# Patient Record
Sex: FEMALE | URBAN - NONMETROPOLITAN AREA
[De-identification: names, ages, dates, MRNs, and addresses within clinical notes are randomized per-mention and may not be internally consistent; named-entity substitution may affect disease eponyms.]

---

## 2017-01-01 ENCOUNTER — TELEPHONE (OUTPATIENT)
Dept: FAMILY MEDICINE CLINIC | Facility: CLINIC | Age: 77
End: 2017-01-01

## 2017-01-01 DIAGNOSIS — Z51.5 HOSPICE CARE PATIENT: Primary | ICD-10-CM

## 2017-01-01 DIAGNOSIS — J44.9 CHRONIC OBSTRUCTIVE PULMONARY DISEASE, UNSPECIFIED COPD TYPE (HCC): Primary | ICD-10-CM

## 2017-01-01 DIAGNOSIS — Z51.5 HOSPICE CARE PATIENT: ICD-10-CM

## 2017-01-01 DIAGNOSIS — N18.6 ESRF (END STAGE RENAL FAILURE) (HCC): ICD-10-CM

## 2017-01-01 RX ORDER — TRAMADOL HYDROCHLORIDE 50 MG/1
50 TABLET ORAL EVERY 6 HOURS PRN
Qty: 60 TABLET | Refills: 0 | COMMUNITY
Start: 2017-01-01

## 2017-01-01 RX ORDER — TRAMADOL HYDROCHLORIDE 50 MG/1
50 TABLET ORAL EVERY 6 HOURS PRN
Qty: 120 TABLET | Refills: 0 | Status: SHIPPED | OUTPATIENT
Start: 2017-01-01 | End: 2017-01-01

## 2017-01-01 RX ORDER — LORAZEPAM 2 MG/ML
0.5 CONCENTRATE ORAL EVERY 2 HOUR PRN
Qty: 30 ML | Refills: 1 | Status: SHIPPED | OUTPATIENT
Start: 2017-01-01

## 2017-01-01 RX ORDER — ATROPINE SULFATE 10 MG/ML
SOLUTION/ DROPS OPHTHALMIC
Qty: 5 ML | Refills: 2 | COMMUNITY
Start: 2017-01-01 | End: 2017-01-01

## 2017-01-01 RX ORDER — ATROPINE SULFATE 10 MG/ML
SOLUTION/ DROPS OPHTHALMIC
Qty: 5 ML | Refills: 2 | Status: SHIPPED | OUTPATIENT
Start: 2017-01-01

## 2017-01-01 RX ORDER — LORAZEPAM 2 MG/ML
0.5 CONCENTRATE ORAL EVERY 2 HOUR PRN
Qty: 30 ML | Refills: 1 | COMMUNITY
Start: 2017-01-01 | End: 2017-01-01

## 2017-01-01 RX ORDER — MORPHINE SULFATE 100 MG/5ML
SOLUTION ORAL
Qty: 30 ML | Refills: 0 | Status: SHIPPED | OUTPATIENT
Start: 2017-01-01

## 2017-01-01 RX ORDER — MORPHINE SULFATE 100 MG/5ML
SOLUTION ORAL
Qty: 30 ML | Refills: 0 | COMMUNITY
Start: 2017-01-01 | End: 2017-01-01

## 2017-01-01 RX ORDER — IPRATROPIUM BROMIDE AND ALBUTEROL SULFATE 2.5; .5 MG/3ML; MG/3ML
3 SOLUTION RESPIRATORY (INHALATION) EVERY 6 HOURS PRN
Qty: 360 ML | Refills: 0 | Status: SHIPPED
Start: 2017-01-01 | End: 2017-01-01

## 2017-01-03 ENCOUNTER — LAB SERVICES (OUTPATIENT)
Dept: OTHER | Age: 77
End: 2017-01-03

## 2017-01-03 LAB
BASOPHIL AUTOMATED: 0.6 %
BASOPHILS: 0.1 (ref 0–0.2)
BEDSIDE GLUCOSE: 86 MG/DL (ref 70–110)
EOSINOPHIL AUTOMATED: 3.6 %
EOSINOPHILS: 0.3 (ref 0–0.5)
HEMATOCRIT: 41.3 % (ref 34.7–45.1)
HEMOGLOBIN: 13 GM/DL (ref 12–15.3)
LYMPHOCYTE AUTOMATED: 15.5 %
LYMPHOCYTES: 1.5 (ref 0.6–3.4)
MEAN CORPUSCULAR HGB: 26.1 PG (ref 26–34)
MEAN CORPUSCULAR HGB: 31.4 G/DL (ref 32.5–35.8)
MEAN CORPUSCULAR VOL: 83.1 FL (ref 80–100)
MEAN PLATELET VOLUME: 9.6 FL (ref 6.8–10.2)
MONOCYTE AUTOMATED: 7.2 %
MONOCYTES: 0.7 (ref 0.3–1)
NEUTROPHIL ABSOLUTE: 6.9 (ref 1.7–7.7)
NEUTROPHIL AUTOMATED: 73.1 %
PLATELET COUNT: 213 K/MM3 (ref 150–450)
RED BLOOD CELL COUNT: 4.97 M/MM3 (ref 3.63–5.04)
RED CELL DISTRIBUTIO: 16.3 % (ref 11.9–15.9)
WHITE BLOOD CELL COU: 9.5 K/MM3 (ref 4–11)

## 2017-01-04 ENCOUNTER — CHARTING TRANS (OUTPATIENT)
Dept: OTHER | Age: 77
End: 2017-01-04

## 2017-01-09 ENCOUNTER — CHARTING TRANS (OUTPATIENT)
Dept: OTHER | Age: 77
End: 2017-01-09

## 2017-01-09 ENCOUNTER — LAB SERVICES (OUTPATIENT)
Dept: OTHER | Age: 77
End: 2017-01-09

## 2017-01-09 LAB
ANION GAP: 3 MEQ/L (ref 8–16)
BASOPHIL AUTOMATED: 0.6 %
BASOPHILS: 0 (ref 0–0.2)
BLOOD UREA NITROGEN (BUN): 26 MG/DL (ref 7–25)
BUN/CREATININE RATIO: 32.1 (ref 7.4–23)
C-REACTIVE PROTEIN, TITER: 2 MG/DL (ref 0–0.9)
CALCIUM, SERUM: 9.3 MG/DL (ref 8.6–10.3)
CARBON DIOXIDE: 29 MMOL/L (ref 21–31)
CHLORIDE, SERUM: 99 MMOL/L (ref 98–107)
CREATININE: 0.81 MG/DL (ref 0.6–1.2)
EOSINOPHIL AUTOMATED: 4 %
EOSINOPHILS: 0.3 (ref 0–0.5)
EST GLOMERULAR FILTRATION RATE: > 60 1.73M SQ
GLUCOSE P FAST SERPL-MCNC: 294 MG/DL (ref 70–99)
HEMATOCRIT: 39 % (ref 34.7–45.1)
HEMOGLOBIN: 12.3 GM/DL (ref 12–15.3)
K (POTASSIUM, SERUM): 4.3 MMOL/L (ref 3.5–5.1)
LYMPHOCYTE AUTOMATED: 11.8 %
LYMPHOCYTES: 0.9 (ref 0.6–3.4)
MEAN CORPUSCULAR HGB: 25.6 PG (ref 26–34)
MEAN CORPUSCULAR HGB: 31.4 G/DL (ref 32.5–35.8)
MEAN CORPUSCULAR VOL: 81.7 FL (ref 80–100)
MEAN PLATELET VOLUME: 9.4 FL (ref 6.8–10.2)
MONOCYTE AUTOMATED: 5.5 %
MONOCYTES: 0.4 (ref 0.3–1)
NA (SODIUM, SERUM): 131 MMOL/L (ref 133–144)
NEUTROPHIL ABSOLUTE: 6.3 (ref 1.7–7.7)
NEUTROPHIL AUTOMATED: 78.1 %
PLATELET COUNT: 228 K/MM3 (ref 150–450)
RED BLOOD CELL COUNT: 4.78 M/MM3 (ref 3.63–5.04)
RED CELL DISTRIBUTIO: 16.2 % (ref 11.9–15.9)
SEDIMENTATION RATE: 58 MM/HR (ref 0–30)
WHITE BLOOD CELL COU: 8 K/MM3 (ref 4–11)

## 2017-01-12 ENCOUNTER — CHARTING TRANS (OUTPATIENT)
Dept: OTHER | Age: 77
End: 2017-01-12

## 2017-01-16 ENCOUNTER — LAB SERVICES (OUTPATIENT)
Dept: OTHER | Age: 77
End: 2017-01-16

## 2017-01-16 LAB
ANION GAP: 6 MEQ/L (ref 8–16)
BASOPHIL AUTOMATED: 1 %
BASOPHILS: 0.1 (ref 0–0.2)
BLOOD UREA NITROGEN (BUN): 23 MG/DL (ref 7–25)
BUN/CREATININE RATIO: 26.7 (ref 7.4–23)
C-REACTIVE PROTEIN, TITER: 2.3 MG/DL (ref 0–0.9)
CALCIUM, SERUM: 9.1 MG/DL (ref 8.6–10.3)
CARBON DIOXIDE: 30 MMOL/L (ref 21–31)
CHLORIDE, SERUM: 100 MMOL/L (ref 98–107)
CREATININE: 0.86 MG/DL (ref 0.6–1.2)
EOSINOPHIL AUTOMATED: 3.5 %
EOSINOPHILS: 0.3 (ref 0–0.5)
EST GLOMERULAR FILTRATION RATE: > 60 1.73M SQ
GLUCOSE P FAST SERPL-MCNC: 344 MG/DL (ref 70–99)
HEMATOCRIT: 38.2 % (ref 34.7–45.1)
HEMOGLOBIN: 12.1 GM/DL (ref 12–15.3)
K (POTASSIUM, SERUM): 4.2 MMOL/L (ref 3.5–5.1)
LYMPHOCYTE AUTOMATED: 10.7 %
LYMPHOCYTES: 0.9 (ref 0.6–3.4)
MEAN CORPUSCULAR HGB: 25.8 PG (ref 26–34)
MEAN CORPUSCULAR HGB: 31.7 G/DL (ref 32.5–35.8)
MEAN CORPUSCULAR VOL: 81.4 FL (ref 80–100)
MEAN PLATELET VOLUME: 9.4 FL (ref 6.8–10.2)
MONOCYTE AUTOMATED: 5.1 %
MONOCYTES: 0.5 (ref 0.3–1)
NA (SODIUM, SERUM): 136 MMOL/L (ref 133–144)
NEUTROPHIL ABSOLUTE: 7 (ref 1.7–7.7)
NEUTROPHIL AUTOMATED: 79.7 %
PLATELET COUNT: 206 K/MM3 (ref 150–450)
RED BLOOD CELL COUNT: 4.69 M/MM3 (ref 3.63–5.04)
RED CELL DISTRIBUTIO: 16.2 % (ref 11.9–15.9)
SEDIMENTATION RATE: 53 MM/HR (ref 0–30)
WHITE BLOOD CELL COU: 8.8 K/MM3 (ref 4–11)

## 2017-01-17 ENCOUNTER — CHARTING TRANS (OUTPATIENT)
Dept: OTHER | Age: 77
End: 2017-01-17

## 2017-01-18 ENCOUNTER — CHARTING TRANS (OUTPATIENT)
Dept: NEPHROLOGY | Age: 77
End: 2017-01-18

## 2017-01-20 ENCOUNTER — LAB SERVICES (OUTPATIENT)
Dept: OTHER | Age: 77
End: 2017-01-20

## 2017-01-20 ENCOUNTER — CHARTING TRANS (OUTPATIENT)
Dept: OTHER | Age: 77
End: 2017-01-20

## 2017-01-20 LAB
BILIRUBIN URINE: NEGATIVE
BLOOD URINE: ABNORMAL
CLARITY: CLEAR
COLOR: YELLOW
GLUCOSE QUALITATIVE U: NEGATIVE
KETONES, URINE: NEGATIVE
LEUKOCYTE ESTERASE URINE: NEGATIVE
NITRITE URINE: NEGATIVE
PH URINE: 5 (ref 5–7)
SPECIFIC GRAVITY URINE: 1.01 (ref 1–1.03)
URINE PROTEIN, QUAL (DIPSTICK): NEGATIVE
UROBILINOGEN URINE: <2

## 2017-01-24 ENCOUNTER — CHARTING TRANS (OUTPATIENT)
Dept: OTHER | Age: 77
End: 2017-01-24

## 2017-01-27 ENCOUNTER — IMAGING SERVICES (OUTPATIENT)
Dept: OTHER | Age: 77
End: 2017-01-27

## 2017-01-27 ENCOUNTER — CHARTING TRANS (OUTPATIENT)
Dept: INTERNAL MEDICINE | Age: 77
End: 2017-01-27

## 2017-01-27 ENCOUNTER — CHARTING TRANS (OUTPATIENT)
Dept: OTHER | Age: 77
End: 2017-01-27

## 2017-01-27 ENCOUNTER — LAB SERVICES (OUTPATIENT)
Dept: OTHER | Age: 77
End: 2017-01-27

## 2017-01-27 LAB
ALBUMIN SERPL BCG-MCNC: 3.7 G/DL (ref 3.6–5.1)
ALP SERPL-CCNC: 142 U/L (ref 45–105)
ALT SERPL W/O P-5'-P-CCNC: 44 U/L (ref 15–43)
AST SERPL-CCNC: 23 U/L (ref 14–43)
BILIRUB SERPL-MCNC: 0.5 MG/DL (ref 0–1.3)
BUN SERPL-MCNC: 27 MG/DL (ref 7–20)
CALCIUM SERPL-MCNC: 9 MG/DL (ref 8.6–10.6)
CHLORIDE SERPL-SCNC: 104 MMOL/L (ref 96–107)
CREATININE, SERUM: 0.9 MG/DL (ref 0.5–1.4)
GFR SERPL CREATININE-BSD FRML MDRD: >60 ML/MIN/{1.73M2}
GFR SERPL CREATININE-BSD FRML MDRD: >60 ML/MIN/{1.73M2}
GLUCOSE SERPL-MCNC: 333 MG/DL (ref 70–200)
HCO3 SERPL-SCNC: 27 MMOL/L (ref 22–32)
POTASSIUM SERPL-SCNC: 4.8 MMOL/L (ref 3.5–5.3)
PROT SERPL-MCNC: 7.4 G/DL (ref 6.4–8.5)
SODIUM SERPL-SCNC: 142 MMOL/L (ref 136–146)

## 2017-01-29 ENCOUNTER — CHARTING TRANS (OUTPATIENT)
Dept: OTHER | Age: 77
End: 2017-01-29

## 2017-01-30 ENCOUNTER — CHARTING TRANS (OUTPATIENT)
Dept: OTHER | Age: 77
End: 2017-01-30

## 2017-01-31 ENCOUNTER — CHARTING TRANS (OUTPATIENT)
Dept: OTHER | Age: 77
End: 2017-01-31

## 2017-02-03 ENCOUNTER — CHARTING TRANS (OUTPATIENT)
Dept: OTHER | Age: 77
End: 2017-02-03

## 2017-02-06 ENCOUNTER — CHARTING TRANS (OUTPATIENT)
Dept: OTHER | Age: 77
End: 2017-02-06

## 2017-02-07 ENCOUNTER — CHARTING TRANS (OUTPATIENT)
Dept: OTHER | Age: 77
End: 2017-02-07

## 2017-02-07 ENCOUNTER — LAB SERVICES (OUTPATIENT)
Dept: OTHER | Age: 77
End: 2017-02-07

## 2017-02-07 LAB — BEDSIDE GLUCOSE: 155 MG/DL (ref 70–110)

## 2017-02-08 ENCOUNTER — CHARTING TRANS (OUTPATIENT)
Dept: NEPHROLOGY | Age: 77
End: 2017-02-08

## 2017-02-08 ENCOUNTER — CHARTING TRANS (OUTPATIENT)
Dept: OTHER | Age: 77
End: 2017-02-08

## 2017-02-08 ENCOUNTER — CHARTING TRANS (OUTPATIENT)
Dept: INTERNAL MEDICINE | Age: 77
End: 2017-02-08

## 2017-02-10 ENCOUNTER — CHARTING TRANS (OUTPATIENT)
Dept: CARDIOLOGY | Age: 77
End: 2017-02-10

## 2017-02-10 ENCOUNTER — IMAGING SERVICES (OUTPATIENT)
Dept: OTHER | Age: 77
End: 2017-02-10

## 2017-02-14 ENCOUNTER — CHARTING TRANS (OUTPATIENT)
Dept: NEPHROLOGY | Age: 77
End: 2017-02-14

## 2017-02-14 ENCOUNTER — CHARTING TRANS (OUTPATIENT)
Dept: OTHER | Age: 77
End: 2017-02-14

## 2017-02-15 ENCOUNTER — CHARTING TRANS (OUTPATIENT)
Dept: OTHER | Age: 77
End: 2017-02-15

## 2017-02-16 ENCOUNTER — CHARTING TRANS (OUTPATIENT)
Dept: OTHER | Age: 77
End: 2017-02-16

## 2017-02-16 ENCOUNTER — CHARTING TRANS (OUTPATIENT)
Dept: NEPHROLOGY | Age: 77
End: 2017-02-16

## 2017-02-18 ENCOUNTER — CHARTING TRANS (OUTPATIENT)
Dept: OTHER | Age: 77
End: 2017-02-18

## 2017-02-20 ENCOUNTER — CHARTING TRANS (OUTPATIENT)
Dept: OTHER | Age: 77
End: 2017-02-20

## 2017-02-21 ENCOUNTER — CHARTING TRANS (OUTPATIENT)
Dept: NEPHROLOGY | Age: 77
End: 2017-02-21

## 2017-02-21 ENCOUNTER — CHARTING TRANS (OUTPATIENT)
Dept: OTHER | Age: 77
End: 2017-02-21

## 2017-02-21 ENCOUNTER — CHARTING TRANS (OUTPATIENT)
Dept: INTERNAL MEDICINE | Age: 77
End: 2017-02-21

## 2017-02-23 ENCOUNTER — CHARTING TRANS (OUTPATIENT)
Dept: OTHER | Age: 77
End: 2017-02-23

## 2017-02-23 PROBLEM — N18.6 ESRF (END STAGE RENAL FAILURE) (HCC): Status: ACTIVE | Noted: 2017-01-01

## 2017-02-23 PROBLEM — Z51.5 HOSPICE CARE PATIENT: Status: ACTIVE | Noted: 2017-01-01

## 2017-02-24 ENCOUNTER — CHARTING TRANS (OUTPATIENT)
Dept: OTHER | Age: 77
End: 2017-02-24

## 2017-02-25 ENCOUNTER — CHARTING TRANS (OUTPATIENT)
Dept: OTHER | Age: 77
End: 2017-02-25

## 2017-03-02 ENCOUNTER — CHARTING TRANS (OUTPATIENT)
Dept: OTHER | Age: 77
End: 2017-03-02

## 2017-03-05 ENCOUNTER — CHARTING TRANS (OUTPATIENT)
Dept: OTHER | Age: 77
End: 2017-03-05

## 2017-03-06 ENCOUNTER — CHARTING TRANS (OUTPATIENT)
Dept: OTHER | Age: 77
End: 2017-03-06

## 2017-03-08 ENCOUNTER — CHARTING TRANS (OUTPATIENT)
Dept: OTHER | Age: 77
End: 2017-03-08

## 2017-03-10 ENCOUNTER — CHARTING TRANS (OUTPATIENT)
Dept: OTHER | Age: 77
End: 2017-03-10

## 2017-03-13 ENCOUNTER — CHARTING TRANS (OUTPATIENT)
Dept: OTHER | Age: 77
End: 2017-03-13

## 2017-03-24 ENCOUNTER — CHARTING TRANS (OUTPATIENT)
Dept: OTHER | Age: 77
End: 2017-03-24

## 2017-03-30 ENCOUNTER — CHARTING TRANS (OUTPATIENT)
Dept: OTHER | Age: 77
End: 2017-03-30

## 2017-03-31 ENCOUNTER — CHARTING TRANS (OUTPATIENT)
Dept: OTHER | Age: 77
End: 2017-03-31

## 2017-04-04 ENCOUNTER — CHARTING TRANS (OUTPATIENT)
Dept: OTHER | Age: 77
End: 2017-04-04

## 2017-04-10 ENCOUNTER — CHARTING TRANS (OUTPATIENT)
Dept: OTHER | Age: 77
End: 2017-04-10

## 2017-04-25 ENCOUNTER — CHARTING TRANS (OUTPATIENT)
Dept: OTHER | Age: 77
End: 2017-04-25

## 2017-05-02 ENCOUNTER — CHARTING TRANS (OUTPATIENT)
Dept: OTHER | Age: 77
End: 2017-05-02

## 2017-05-08 ENCOUNTER — CHARTING TRANS (OUTPATIENT)
Dept: OTHER | Age: 77
End: 2017-05-08

## 2017-05-11 ENCOUNTER — CHARTING TRANS (OUTPATIENT)
Dept: OTHER | Age: 77
End: 2017-05-11

## 2017-05-19 ENCOUNTER — CHARTING TRANS (OUTPATIENT)
Dept: OTHER | Age: 77
End: 2017-05-19

## 2017-05-22 ENCOUNTER — CHARTING TRANS (OUTPATIENT)
Dept: OTHER | Age: 77
End: 2017-05-22

## 2017-05-25 ENCOUNTER — CHARTING TRANS (OUTPATIENT)
Dept: OTHER | Age: 77
End: 2017-05-25

## 2017-05-26 ENCOUNTER — CHARTING TRANS (OUTPATIENT)
Dept: OTHER | Age: 77
End: 2017-05-26

## 2017-06-02 ENCOUNTER — CHARTING TRANS (OUTPATIENT)
Dept: OTHER | Age: 77
End: 2017-06-02

## 2017-06-09 ENCOUNTER — CHARTING TRANS (OUTPATIENT)
Dept: OTHER | Age: 77
End: 2017-06-09

## 2017-06-13 ENCOUNTER — CHARTING TRANS (OUTPATIENT)
Dept: OTHER | Age: 77
End: 2017-06-13

## 2017-06-20 ENCOUNTER — CHARTING TRANS (OUTPATIENT)
Dept: OTHER | Age: 77
End: 2017-06-20

## 2017-06-22 ENCOUNTER — CHARTING TRANS (OUTPATIENT)
Dept: OTHER | Age: 77
End: 2017-06-22

## 2017-06-23 ENCOUNTER — CHARTING TRANS (OUTPATIENT)
Dept: OTHER | Age: 77
End: 2017-06-23

## 2017-06-26 ENCOUNTER — CHARTING TRANS (OUTPATIENT)
Dept: OTHER | Age: 77
End: 2017-06-26

## 2017-06-26 NOTE — TELEPHONE ENCOUNTER
Orders reviewed and faxed to [facility] and verbally reviewed with Keyonna Freeman.  Verbal order given for treatment of Stage 2 pressure ulcer on left side of her buttocks, WOCN to eval and treat and ok for March catheter 20 Fr.

## 2017-06-26 NOTE — TELEPHONE ENCOUNTER
Wallace Marina from [facility] calling back - wants to talk to nurse and get orders signed. Needs to discuss some medications. Please call.

## 2017-06-26 NOTE — TELEPHONE ENCOUNTER
NEW ADMIT FOR RESPIT CARE FOR A SHORT STAY   WILL FAX REQUEST FOR CONTROLLED MEDICATIONS ALSO BUT NEEDS TO Mar Stewart OVER MED LIST

## 2017-06-26 NOTE — TELEPHONE ENCOUNTER
Orders and medication list received and reviewed with Nirav Goins and placed for Dr. Peterson Guzman to review and approve.

## 2017-06-28 ENCOUNTER — CHARTING TRANS (OUTPATIENT)
Dept: OTHER | Age: 77
End: 2017-06-28

## 2017-06-28 NOTE — TELEPHONE ENCOUNTER
Per susan Isaacs to change to TID from BID.  Order faxed to [facility] and confirmed with ALFA Mcdaniels at [facility].

## 2017-06-29 NOTE — TELEPHONE ENCOUNTER
Patient does not want to take Tramadol, can they discontinue the Tramadol?  OK per Dr. Kathleen Benavides and order faxed to [address] [place] [address].

## 2017-06-30 NOTE — TELEPHONE ENCOUNTER
They have been treating pt with eyedrops. [facility] was there today and the eyedrop that they were using was a slight variation from what was written on script. They need to have this changed. Please call to discuss change.

## 2017-06-30 NOTE — TELEPHONE ENCOUNTER
Per Dr. Jones: ok to change from Brimonidine 2% tartrate to Dorzdomide HCL 2%  Faxed order back to [facility]

## 2017-07-01 ENCOUNTER — CHARTING TRANS (OUTPATIENT)
Dept: OTHER | Age: 77
End: 2017-07-01

## 2017-07-03 ENCOUNTER — CHARTING TRANS (OUTPATIENT)
Dept: OTHER | Age: 77
End: 2017-07-03

## 2017-07-06 ENCOUNTER — CHARTING TRANS (OUTPATIENT)
Dept: OTHER | Age: 77
End: 2017-07-06

## 2017-07-10 NOTE — TELEPHONE ENCOUNTER
Verbal order given to [facility] [employer] [facility] and verbal confirmation given. She states the Diflucan might not be covered since the patient is a hospice patient.

## 2017-07-10 NOTE — TELEPHONE ENCOUNTER
Patient has been taking Mineral oil 30 ml daily , patient is having smearing from her rectum maybe this is why ( she has a colostomy) . Can they have an order to give only 15-45 ml daily? Would patient still need to see surgeon?        Per Dr. Trevin Chavira, verb

## 2017-08-08 ENCOUNTER — MED REC SCAN ONLY (OUTPATIENT)
Dept: FAMILY MEDICINE CLINIC | Facility: CLINIC | Age: 77
End: 2017-08-08

## 2017-11-22 ENCOUNTER — CHARTING TRANS (OUTPATIENT)
Dept: OTHER | Age: 77
End: 2017-11-22

## 2018-11-23 ENCOUNTER — IMAGING SERVICES (OUTPATIENT)
Dept: OTHER | Age: 78
End: 2018-11-23

## 2018-11-28 VITALS
WEIGHT: 293 LBS | TEMPERATURE: 68 F | HEART RATE: 66 BPM | RESPIRATION RATE: 20 BRPM | SYSTOLIC BLOOD PRESSURE: 124 MMHG | OXYGEN SATURATION: 96 % | DIASTOLIC BLOOD PRESSURE: 76 MMHG

## 2018-11-29 VITALS
DIASTOLIC BLOOD PRESSURE: 62 MMHG | BODY MASS INDEX: 51.91 KG/M2 | WEIGHT: 293 LBS | HEIGHT: 63 IN | OXYGEN SATURATION: 92 % | SYSTOLIC BLOOD PRESSURE: 122 MMHG | HEART RATE: 82 BPM | RESPIRATION RATE: 20 BRPM

## 2018-11-29 VITALS — DIASTOLIC BLOOD PRESSURE: 76 MMHG | HEART RATE: 66 BPM | RESPIRATION RATE: 20 BRPM | SYSTOLIC BLOOD PRESSURE: 124 MMHG

## 2018-11-29 VITALS
DIASTOLIC BLOOD PRESSURE: 64 MMHG | WEIGHT: 293 LBS | BODY MASS INDEX: 51.91 KG/M2 | RESPIRATION RATE: 20 BRPM | SYSTOLIC BLOOD PRESSURE: 137 MMHG | HEIGHT: 63 IN | OXYGEN SATURATION: 95 % | HEART RATE: 80 BPM | TEMPERATURE: 97.9 F

## 2018-11-29 VITALS
WEIGHT: 293 LBS | SYSTOLIC BLOOD PRESSURE: 137 MMHG | HEART RATE: 80 BPM | BODY MASS INDEX: 62 KG/M2 | TEMPERATURE: 97.9 F | OXYGEN SATURATION: 94 % | RESPIRATION RATE: 22 BRPM | DIASTOLIC BLOOD PRESSURE: 64 MMHG

## 2018-11-29 VITALS
HEART RATE: 88 BPM | DIASTOLIC BLOOD PRESSURE: 60 MMHG | WEIGHT: 293 LBS | SYSTOLIC BLOOD PRESSURE: 126 MMHG | BODY MASS INDEX: 51.91 KG/M2 | HEIGHT: 63 IN

## 2018-11-29 VITALS
HEIGHT: 63 IN | WEIGHT: 293 LBS | DIASTOLIC BLOOD PRESSURE: 78 MMHG | HEART RATE: 72 BPM | OXYGEN SATURATION: 92 % | TEMPERATURE: 98.6 F | RESPIRATION RATE: 22 BRPM | SYSTOLIC BLOOD PRESSURE: 136 MMHG | BODY MASS INDEX: 51.91 KG/M2

## 2018-11-29 VITALS
TEMPERATURE: 97.4 F | RESPIRATION RATE: 22 BRPM | OXYGEN SATURATION: 98 % | DIASTOLIC BLOOD PRESSURE: 70 MMHG | WEIGHT: 293 LBS | HEART RATE: 64 BPM | SYSTOLIC BLOOD PRESSURE: 134 MMHG

## 2018-11-29 VITALS
WEIGHT: 293 LBS | TEMPERATURE: 97 F | DIASTOLIC BLOOD PRESSURE: 62 MMHG | SYSTOLIC BLOOD PRESSURE: 134 MMHG | BODY MASS INDEX: 60.05 KG/M2

## 2018-11-29 VITALS
RESPIRATION RATE: 22 BRPM | HEART RATE: 87 BPM | TEMPERATURE: 97.4 F | SYSTOLIC BLOOD PRESSURE: 124 MMHG | DIASTOLIC BLOOD PRESSURE: 62 MMHG | OXYGEN SATURATION: 91 %

## 2018-12-01 ENCOUNTER — DOCUMENTATION (OUTPATIENT)
Dept: OPHTHALMOLOGY | Age: 78
End: 2018-12-01

## 2019-01-24 ENCOUNTER — IMAGING SERVICES (OUTPATIENT)
Dept: OTHER | Age: 79
End: 2019-01-24